# Patient Record
Sex: FEMALE | URBAN - METROPOLITAN AREA
[De-identification: names, ages, dates, MRNs, and addresses within clinical notes are randomized per-mention and may not be internally consistent; named-entity substitution may affect disease eponyms.]

---

## 2022-04-13 ENCOUNTER — OFFICE VISIT (OUTPATIENT)
Dept: URBAN - METROPOLITAN AREA CLINIC 10 | Facility: CLINIC | Age: 47
End: 2022-04-13
Payer: COMMERCIAL

## 2022-04-13 DIAGNOSIS — H52.13 MYOPIA, BILATERAL: ICD-10-CM

## 2022-04-13 DIAGNOSIS — H04.123 DRY EYE SYNDROME OF BILATERAL LACRIMAL GLANDS: Primary | ICD-10-CM

## 2022-04-13 PROCEDURE — 92004 COMPRE OPH EXAM NEW PT 1/>: CPT | Performed by: STUDENT IN AN ORGANIZED HEALTH CARE EDUCATION/TRAINING PROGRAM

## 2022-04-13 PROCEDURE — 92134 CPTRZ OPH DX IMG PST SGM RTA: CPT | Performed by: STUDENT IN AN ORGANIZED HEALTH CARE EDUCATION/TRAINING PROGRAM

## 2022-04-13 NOTE — IMPRESSION/PLAN
Impression: Dry eye syndrome of bilateral lacrimal glands: H04.123. Plan: Patient educated on signs and symptoms of dry eye and importance of environmental factors.  Discussed using OTC AT's with patient D[facility] [place] long-term

## 2022-04-13 NOTE — IMPRESSION/PLAN
Impression: Myopia, bilateral: H52.13. Plan: Patient educated on condition, updated and released SRx. Monitor. No add at patient's request, takes off glasses to read.